# Patient Record
Sex: FEMALE | Race: WHITE | NOT HISPANIC OR LATINO
[De-identification: names, ages, dates, MRNs, and addresses within clinical notes are randomized per-mention and may not be internally consistent; named-entity substitution may affect disease eponyms.]

---

## 2022-12-01 ENCOUNTER — NON-APPOINTMENT (OUTPATIENT)
Age: 67
End: 2022-12-01

## 2023-01-03 ENCOUNTER — APPOINTMENT (OUTPATIENT)
Dept: RHEUMATOLOGY | Facility: CLINIC | Age: 68
End: 2023-01-03
Payer: MEDICARE

## 2023-01-03 VITALS
TEMPERATURE: 97.9 F | HEIGHT: 65 IN | RESPIRATION RATE: 14 BRPM | WEIGHT: 115 LBS | OXYGEN SATURATION: 98 % | BODY MASS INDEX: 19.16 KG/M2 | HEART RATE: 69 BPM | DIASTOLIC BLOOD PRESSURE: 60 MMHG | SYSTOLIC BLOOD PRESSURE: 115 MMHG

## 2023-01-03 DIAGNOSIS — R76.8 OTHER SPECIFIED ABNORMAL IMMUNOLOGICAL FINDINGS IN SERUM: ICD-10-CM

## 2023-01-03 DIAGNOSIS — M25.50 PAIN IN UNSPECIFIED JOINT: ICD-10-CM

## 2023-01-03 PROBLEM — Z00.00 ENCOUNTER FOR PREVENTIVE HEALTH EXAMINATION: Status: ACTIVE | Noted: 2023-01-03

## 2023-01-03 PROCEDURE — 99205 OFFICE O/P NEW HI 60 MIN: CPT

## 2023-01-03 RX ORDER — TRAVOPROST 0.04 MG/ML
0 SOLUTION OPHTHALMIC
Qty: 8 | Refills: 0 | Status: ACTIVE | COMMUNITY
Start: 2022-06-07

## 2023-01-03 RX ORDER — ATORVASTATIN CALCIUM 20 MG/1
20 TABLET, FILM COATED ORAL
Qty: 90 | Refills: 0 | Status: ACTIVE | COMMUNITY
Start: 2022-11-29

## 2023-01-03 NOTE — HISTORY OF PRESENT ILLNESS
[FreeTextEntry1] : 68yo F with HLD, Osteoporosis presented to the office for evaluation of + IRA\par \par IRA\par annual visit\par + IRA\par complains of transitory arthralgias\par family member with sjogren\par today feels well\par prior testing with fluctuating RF levels\par \par \par ROS\par wax - wane arthralgias, multiple joints\par no synovitis\par no dactylitis\par no constitutional symptoms\par no reports of malar rash\par no oral ulcers\par no eye complains\par no dry eyes or mouth\par

## 2023-01-03 NOTE — PHYSICAL EXAM
[General Appearance - Alert] : alert [General Appearance - In No Acute Distress] : in no acute distress [Sclera] : the sclera and conjunctiva were normal [Neck Appearance] : the appearance of the neck was normal [] : no respiratory distress [Auscultation Breath Sounds / Voice Sounds] : lungs were clear to auscultation bilaterally [Heart Rate And Rhythm] : heart rate was normal and rhythm regular [Heart Sounds] : normal S1 and S2 [Abnormal Walk] : normal gait [Nail Clubbing] : no clubbing  or cyanosis of the fingernails [Musculoskeletal - Swelling] : no joint swelling seen [Motor Tone] : muscle strength and tone were normal [No Focal Deficits] : no focal deficits [Oriented To Time, Place, And Person] : oriented to person, place, and time